# Patient Record
Sex: MALE | Race: WHITE | NOT HISPANIC OR LATINO | RURAL
[De-identification: names, ages, dates, MRNs, and addresses within clinical notes are randomized per-mention and may not be internally consistent; named-entity substitution may affect disease eponyms.]

---

## 2021-02-02 ENCOUNTER — APPOINTMENT (OUTPATIENT)
Age: 26
Setting detail: DERMATOLOGY
End: 2021-02-03

## 2021-02-02 VITALS — TEMPERATURE: 97.6 F

## 2021-02-02 DIAGNOSIS — L81.4 OTHER MELANIN HYPERPIGMENTATION: ICD-10-CM

## 2021-02-02 DIAGNOSIS — B07.8 OTHER VIRAL WARTS: ICD-10-CM

## 2021-02-02 DIAGNOSIS — D22 MELANOCYTIC NEVI: ICD-10-CM

## 2021-02-02 DIAGNOSIS — D485 NEOPLASM OF UNCERTAIN BEHAVIOR OF SKIN: ICD-10-CM

## 2021-02-02 PROBLEM — D22.5 MELANOCYTIC NEVI OF TRUNK: Status: ACTIVE | Noted: 2021-02-02

## 2021-02-02 PROBLEM — D48.5 NEOPLASM OF UNCERTAIN BEHAVIOR OF SKIN: Status: ACTIVE | Noted: 2021-02-02

## 2021-02-02 PROCEDURE — OTHER BIOPSY BY SHAVE METHOD: OTHER

## 2021-02-02 PROCEDURE — OTHER LIQUID NITROGEN: OTHER

## 2021-02-02 PROCEDURE — OTHER MIPS QUALITY: OTHER

## 2021-02-02 PROCEDURE — OTHER REASSURANCE: OTHER

## 2021-02-02 PROCEDURE — OTHER COUNSELING: OTHER

## 2021-02-02 PROCEDURE — 99203 OFFICE O/P NEW LOW 30 MIN: CPT | Mod: 25

## 2021-02-02 PROCEDURE — 11102 TANGNTL BX SKIN SINGLE LES: CPT | Mod: 59

## 2021-02-02 PROCEDURE — 17110 DESTRUCT B9 LESION 1-14: CPT

## 2021-02-02 ASSESSMENT — LOCATION DETAILED DESCRIPTION DERM
LOCATION DETAILED: RIGHT PROXIMAL RADIAL DORSAL INDEX FINGER
LOCATION DETAILED: INFERIOR THORACIC SPINE
LOCATION DETAILED: LEFT MID-UPPER BACK
LOCATION DETAILED: RIGHT POSTERIOR SHOULDER
LOCATION DETAILED: LEFT POSTERIOR SHOULDER
LOCATION DETAILED: RIGHT PROXIMAL DORSAL INDEX FINGER

## 2021-02-02 ASSESSMENT — LOCATION ZONE DERM
LOCATION ZONE: TRUNK
LOCATION ZONE: FINGER
LOCATION ZONE: ARM

## 2021-02-02 ASSESSMENT — LOCATION SIMPLE DESCRIPTION DERM
LOCATION SIMPLE: UPPER BACK
LOCATION SIMPLE: LEFT SHOULDER
LOCATION SIMPLE: RIGHT SHOULDER
LOCATION SIMPLE: LEFT UPPER BACK
LOCATION SIMPLE: RIGHT INDEX FINGER

## 2021-02-02 NOTE — PROCEDURE: BIOPSY BY SHAVE METHOD
Validate Anticipated Plan: No
Wound Care: Vaseline
Was A Bandage Applied: Yes
Electrodesiccation Text: The wound bed was treated with electrodesiccation after the biopsy was performed.
Billing Type: Third-Party Bill
Consent: Written consent was obtained and risks were reviewed including but not limited to scarring, infection, bleeding, scabbing, incomplete removal, nerve damage and allergy to anesthesia.  Clinical evaluation reveals changes suspicious for rule out provided in pathology requisition.  Intent of procedure is to obtain tissue sample for histopathic examination.  A skin biopsy is considered a necessary and appropriate to clarify the diagnosis.
X Size Of Lesion In Cm: 0
Anesthesia Volume In Cc: 0.5
Notification Instructions: Patient will be notified of biopsy results. However, patient instructed to call the office if not contacted within 2 weeks.
Type Of Destruction Used: Curettage
Information: Selecting Yes will display possible errors in your note based on the variables you have selected. This validation is only offered as a suggestion for you. PLEASE NOTE THAT THE VALIDATION TEXT WILL BE REMOVED WHEN YOU FINALIZE YOUR NOTE. IF YOU WANT TO FAX A PRELIMINARY NOTE YOU WILL NEED TO TOGGLE THIS TO 'NO' IF YOU DO NOT WANT IT IN YOUR FAXED NOTE.
Silver Nitrate Text: The wound bed was treated with silver nitrate after the biopsy was performed.
Electrodesiccation And Curettage Text: The wound bed was treated with electrodesiccation and curettage after the biopsy was performed.
Post-Care Instructions: I reviewed with the patient in detail post-care instructions. Patient is to keep the biopsy site dry overnight, and then apply bacitracin twice daily until healed. Patient may apply hydrogen peroxide soaks to remove any crusting.
Anesthesia Type: 1% lidocaine with epinephrine
Biopsy Method: 10 blade
Cryotherapy Text: The wound bed was treated with cryotherapy after the biopsy was performed.
Depth Of Biopsy: dermis
Curettage Text: The wound bed was treated with curettage after the biopsy was performed.
Size Of Lesion In Cm: 0.4
Dressing: bandage
Hemostasis: Electrocautery
Biopsy Type: H and E
Detail Level: Detailed